# Patient Record
Sex: FEMALE | Race: WHITE | NOT HISPANIC OR LATINO
[De-identification: names, ages, dates, MRNs, and addresses within clinical notes are randomized per-mention and may not be internally consistent; named-entity substitution may affect disease eponyms.]

---

## 2017-02-12 ENCOUNTER — TRANSCRIPTION ENCOUNTER (OUTPATIENT)
Age: 77
End: 2017-02-12

## 2021-08-02 ENCOUNTER — NON-APPOINTMENT (OUTPATIENT)
Age: 81
End: 2021-08-02

## 2021-11-10 ENCOUNTER — FORM ENCOUNTER (OUTPATIENT)
Age: 81
End: 2021-11-10

## 2021-11-16 ENCOUNTER — NON-APPOINTMENT (OUTPATIENT)
Age: 81
End: 2021-11-16

## 2021-12-06 DIAGNOSIS — Z82.49 FAMILY HISTORY OF ISCHEMIC HEART DISEASE AND OTHER DISEASES OF THE CIRCULATORY SYSTEM: ICD-10-CM

## 2021-12-06 DIAGNOSIS — Z78.9 OTHER SPECIFIED HEALTH STATUS: ICD-10-CM

## 2021-12-06 DIAGNOSIS — K57.32 DIVERTICULITIS OF LARGE INTESTINE W/OUT PERFORATION OR ABSCESS W/OUT BLEEDING: ICD-10-CM

## 2021-12-06 DIAGNOSIS — Z93.3 COLOSTOMY STATUS: ICD-10-CM

## 2021-12-06 DIAGNOSIS — Z82.3 FAMILY HISTORY OF STROKE: ICD-10-CM

## 2021-12-06 DIAGNOSIS — Z63.4 DISAPPEARANCE AND DEATH OF FAMILY MEMBER: ICD-10-CM

## 2021-12-06 DIAGNOSIS — F51.04 PSYCHOPHYSIOLOGIC INSOMNIA: ICD-10-CM

## 2021-12-06 DIAGNOSIS — Z98.890 OTHER SPECIFIED POSTPROCEDURAL STATES: ICD-10-CM

## 2021-12-06 DIAGNOSIS — Z87.81 PERSONAL HISTORY OF (HEALED) TRAUMATIC FRACTURE: ICD-10-CM

## 2021-12-06 DIAGNOSIS — F17.200 NICOTINE DEPENDENCE, UNSPECIFIED, UNCOMPLICATED: ICD-10-CM

## 2021-12-06 RX ORDER — ATORVASTATIN CALCIUM 10 MG/1
10 TABLET, FILM COATED ORAL
Refills: 0 | Status: ACTIVE | COMMUNITY

## 2021-12-06 SDOH — SOCIAL STABILITY - SOCIAL INSECURITY: DISSAPEARANCE AND DEATH OF FAMILY MEMBER: Z63.4

## 2021-12-10 ENCOUNTER — APPOINTMENT (OUTPATIENT)
Dept: FAMILY MEDICINE | Facility: CLINIC | Age: 81
End: 2021-12-10
Payer: MEDICARE

## 2021-12-10 VITALS
WEIGHT: 117 LBS | HEIGHT: 66.5 IN | TEMPERATURE: 97.7 F | SYSTOLIC BLOOD PRESSURE: 124 MMHG | DIASTOLIC BLOOD PRESSURE: 84 MMHG | HEART RATE: 60 BPM | OXYGEN SATURATION: 98 % | BODY MASS INDEX: 18.58 KG/M2

## 2021-12-10 PROCEDURE — G0439: CPT

## 2021-12-10 RX ORDER — LOSARTAN POTASSIUM 50 MG/1
50 TABLET, FILM COATED ORAL
Refills: 0 | Status: DISCONTINUED | COMMUNITY
End: 2021-12-10

## 2021-12-10 RX ORDER — METOPROLOL SUCCINATE 50 MG/1
50 TABLET, EXTENDED RELEASE ORAL
Refills: 0 | Status: ACTIVE | COMMUNITY

## 2021-12-10 RX ORDER — LOSARTAN POTASSIUM 100 MG/1
100 TABLET, FILM COATED ORAL
Refills: 0 | Status: ACTIVE | COMMUNITY

## 2021-12-10 NOTE — ASSESSMENT
[FreeTextEntry1] : KIERA KESSLER is a 81 year old female here for a physical exam. She has a history of peripheral arterial disease, hypercholesterolemia, hypertension, anxiety, depression, and insomnia. \par \par She sees her cardiologist (Dr. Aragon) regularly and is not due for an EKG.  She had a pacemaker in August.\par \par She is taking Alprazolam regularly for anxiety and insomnia and requests a refill for this. She usually takes 2 tablets daily but occasionally needs an extra pill. She asks for a new Rx for 90 pills and will try to make this last 45 days. We discussed that there are other options to treat insomnia and anxiety but she declines.\par \par RF Xanax\par \par [facility]

## 2021-12-10 NOTE — HISTORY OF PRESENT ILLNESS
[FreeTextEntry1] : KIERA KESSLER is a 81 year old female here for a physical exam.  [de-identified] : Her last PE was 8/2020\par Her last tetanus shot was unknown; patient declined booster\par She has had Prevnar and Pneumovax \par She has not had Shingrix \par COVID vaccine\par Her last dentist visit was less than 6 months ago \par Her last eye doctor appointment was less than one year ago \par Her last dermatologist visit was less than one year ago \par Her diet is healthy overall\par Exercise: rarely\par Her last GYN visit was 2014, prior to hysterectomy, patient declines further testing \par Her last mammogram was 3/2017\par Her last colonoscopy was 2014\par Her last DEXA was unknown, patient declines further testing

## 2021-12-10 NOTE — PLAN
[FreeTextEntry1] : Reviewed age-appropriate preventive screening tests with patient. She declines all preventive testing. She is getting the high dose flu shot at [facility] later this month. She declines any other vaccines.\par \par She is not fasting today and would like to go to [facility] for her blood work.\par \par Discussed clean eating (e.g. Mediterranean style diet) and recommendations for regular exercise/staying as physically active as possible.\par \par Reviewed importance of good self care (e.g. meditation, yoga, adequate rest, regular exercise, magnesium, clean eating, etc.).

## 2022-01-10 ENCOUNTER — FORM ENCOUNTER (OUTPATIENT)
Age: 82
End: 2022-01-10

## 2022-02-09 ENCOUNTER — APPOINTMENT (OUTPATIENT)
Dept: FAMILY MEDICINE | Facility: CLINIC | Age: 82
End: 2022-02-09
Payer: MEDICARE

## 2022-02-09 VITALS
HEIGHT: 66.5 IN | HEART RATE: 71 BPM | BODY MASS INDEX: 17.95 KG/M2 | DIASTOLIC BLOOD PRESSURE: 80 MMHG | TEMPERATURE: 95.1 F | OXYGEN SATURATION: 97 % | WEIGHT: 113 LBS | SYSTOLIC BLOOD PRESSURE: 146 MMHG

## 2022-02-09 DIAGNOSIS — L01.00 IMPETIGO, UNSPECIFIED: ICD-10-CM

## 2022-02-09 DIAGNOSIS — B00.9 HERPESVIRAL INFECTION, UNSPECIFIED: ICD-10-CM

## 2022-02-09 DIAGNOSIS — Z86.19 PERSONAL HISTORY OF OTHER INFECTIOUS AND PARASITIC DISEASES: ICD-10-CM

## 2022-02-09 PROCEDURE — 99213 OFFICE O/P EST LOW 20 MIN: CPT

## 2022-02-09 NOTE — HEALTH RISK ASSESSMENT
[Current] : Current [Yes] : Yes [4 or more  times a week (4 pts)] : 4 or more  times a week (4 points) [1 or 2 (0 pts)] : 1 or 2 (0 points) [Never (0 pts)] : Never (0 points) [No] : In the past 12 months have you used drugs other than those required for medical reasons? No [One fall no injury in past year] : Patient reported one fall in the past year without injury [0] : 2) Feeling down, depressed, or hopeless: Not at all (0) [PHQ-2 Negative - No further assessment needed] : PHQ-2 Negative - No further assessment needed [de-identified] : 1 PPD [Audit-CScore] : 4 [de-identified] : walks [[id number]] : 0

## 2022-02-09 NOTE — REVIEW OF SYSTEMS
[Earache] : no earache [Nasal Discharge] : nasal discharge [Sore Throat] : no sore throat [Postnasal Drip] : no postnasal drip [Itching] : Itching [Skin Rash] : skin rash [Negative] : Neurological

## 2022-02-09 NOTE — PHYSICAL EXAM
[Normal Outer Ear/Nose] : the outer ears and nose were normal in appearance [Normal Oropharynx] : the oropharynx was normal [Normal] : normal rate, regular rhythm, normal S1 and S2 and no murmur heard [No Edema] : there was no peripheral edema [de-identified] : mild edema/erythema of the nasal mucosa with clear drainage, +PND [de-identified] : multiple small erythematous lesions between the nose and lips with larger lesion and honey-colored crusting just above the vermillion border, no active bleeding/drainage.  no rash over the back, no vesicular lesions on the skin of the back.

## 2022-02-09 NOTE — ASSESSMENT
[FreeTextEntry1] : Patient is a 80yo female presenting with 1 week of rash beneath the nose above the lip, has HSV apperance but also with honey colored lesions indicating possible superimposed bacterial infection from repetitive nasal drainage and wiping of the nose.  No skin lesion on the back, pt feels symptoms along the bra strap.\par \par HSV/Impetigo\par - Pt with history of HSV in the past.\par - Valacyclovir 1g q12h x2 doses.\par - Mupirocin topical TID.\par - Keep area clean and dry, try and avoid repetitive wiping.\par - Follow up with Dermatology if symptoms persist.\par \par Pruritus of back\par - Area of pruritus follows bra lines.\par - Advised to change bra, wear something with less lace to see if this helps.\par - May use topical calamine lotion for itching.\par - May try OTC Benadryl as needed for itching.\par - Follow up if symptoms persist/worsen or if rash forms.\par \par Call the office or go to the ED immediately if you develop new, worsening or concerning symptoms including high fever, severe headache/worst headache of your life, confusion, dizziness/lightheadedness, loss of consciousness, severe chest pain, difficulty breathing, shortness of breath, severe abdominal pain, excessive vomiting/diarrhea, inability to feel/move the extremities, or any other concerning symptoms.\par

## 2022-02-09 NOTE — HISTORY OF PRESENT ILLNESS
[FreeTextEntry8] : Pt with increased clear nasal drainage causing sores underneath the nose above the lips.\par Denies recent illness, fever, cough, sneezing.\par Has had HSV of the lip in the past.  States this feels slightly similar but less tingling.\par Pt tried Abreva without improvement.\par \par States has a small area on the back that she is concerned may be shingles.\par Pt states area is itching, not burning.\par Denies prior history of shingles.\par Unsure if she ever had chicken pox.\par Did not receive Shingles vaccination.

## 2022-03-15 ENCOUNTER — FORM ENCOUNTER (OUTPATIENT)
Age: 82
End: 2022-03-15

## 2022-03-30 ENCOUNTER — FORM ENCOUNTER (OUTPATIENT)
Age: 82
End: 2022-03-30

## 2022-09-28 ENCOUNTER — FORM ENCOUNTER (OUTPATIENT)
Age: 82
End: 2022-09-28

## 2023-03-23 ENCOUNTER — FORM ENCOUNTER (OUTPATIENT)
Age: 83
End: 2023-03-23

## 2023-03-24 ENCOUNTER — OFFICE (OUTPATIENT)
Dept: URBAN - METROPOLITAN AREA CLINIC 70 | Facility: CLINIC | Age: 83
Setting detail: OPHTHALMOLOGY
End: 2023-03-24
Payer: MEDICARE

## 2023-03-24 DIAGNOSIS — H25.13: ICD-10-CM

## 2023-03-24 DIAGNOSIS — D31.32: ICD-10-CM

## 2023-03-24 DIAGNOSIS — H01.001: ICD-10-CM

## 2023-03-24 DIAGNOSIS — H35.54: ICD-10-CM

## 2023-03-24 DIAGNOSIS — H01.002: ICD-10-CM

## 2023-03-24 DIAGNOSIS — H16.223: ICD-10-CM

## 2023-03-24 DIAGNOSIS — H40.013: ICD-10-CM

## 2023-03-24 DIAGNOSIS — H01.004: ICD-10-CM

## 2023-03-24 DIAGNOSIS — H01.005: ICD-10-CM

## 2023-03-24 PROBLEM — H52.7 REFRACTIVE ERROR ; BOTH EYES: Status: ACTIVE | Noted: 2023-03-24

## 2023-03-24 PROCEDURE — 92250 FUNDUS PHOTOGRAPHY W/I&R: CPT | Performed by: STUDENT IN AN ORGANIZED HEALTH CARE EDUCATION/TRAINING PROGRAM

## 2023-03-24 PROCEDURE — 92014 COMPRE OPH EXAM EST PT 1/>: CPT | Performed by: STUDENT IN AN ORGANIZED HEALTH CARE EDUCATION/TRAINING PROGRAM

## 2023-03-24 ASSESSMENT — KERATOMETRY
OS_AXISANGLE_DEGREES: 033
OD_K1POWER_DIOPTERS: 44.50
OD_AXISANGLE_DEGREES: 151
OS_K2POWER_DIOPTERS: 45.00
OD_K2POWER_DIOPTERS: 45.00
OS_K1POWER_DIOPTERS: 44.25

## 2023-03-24 ASSESSMENT — REFRACTION_MANIFEST
OD_VA1: 20/NI
OD_SPHERE: +2.75
OD_ADD: +2.75
OS_ADD: +2.75
OS_SPHERE: +3.25
OD_CYLINDER: -1.00
OS_AXIS: 96
OS_VA1: 20/NI
OD_AXIS: 73
OS_CYLINDER: -0.75

## 2023-03-24 ASSESSMENT — REFRACTION_CURRENTRX
OD_AXIS: 073
OS_AXIS: 095
OD_CYLINDER: -1.00
OS_AXIS: 103
OD_OVR_VA: 20/
OD_CYLINDER: -1.00
OS_OVR_VA: 20/
OS_OVR_VA: 20/
OD_SPHERE: +2.75
OD_SPHERE: +4.50
OS_CYLINDER: -0.75
OD_AXIS: 071
OS_CYLINDER: -0.75
OS_SPHERE: +3.25
OD_OVR_VA: 20/
OS_SPHERE: +4.75

## 2023-03-24 ASSESSMENT — REFRACTION_AUTOREFRACTION
OS_AXIS: 92
OD_SPHERE: +2.50
OS_SPHERE: +2.75
OD_CYLINDER: -1.50
OS_CYLINDER: -1.25
OD_AXIS: 083

## 2023-03-24 ASSESSMENT — VISUAL ACUITY
OS_BCVA: 20/30
OD_BCVA: 20/30

## 2023-03-24 ASSESSMENT — SPHEQUIV_DERIVED
OS_SPHEQUIV: 2.125
OS_SPHEQUIV: 2.875
OD_SPHEQUIV: 1.75
OD_SPHEQUIV: 2.25

## 2023-03-24 ASSESSMENT — SUPERFICIAL PUNCTATE KERATITIS (SPK)
OS_SPK: 1+ 2+
OD_SPK: 1+ 2+

## 2023-03-24 ASSESSMENT — AXIALLENGTH_DERIVED
OD_AL: 22.3277
OS_AL: 22.4128
OS_AL: 22.1521
OD_AL: 22.5036

## 2023-03-24 ASSESSMENT — LID EXAM ASSESSMENTS
OD_BLEPHARITIS: RLL RUL
OS_BLEPHARITIS: LLL LUL

## 2023-03-24 ASSESSMENT — CONFRONTATIONAL VISUAL FIELD TEST (CVF)
OS_FINDINGS: FULL
OD_FINDINGS: FULL

## 2023-04-17 ENCOUNTER — FORM ENCOUNTER (OUTPATIENT)
Age: 83
End: 2023-04-17

## 2023-04-18 ENCOUNTER — OFFICE (OUTPATIENT)
Dept: URBAN - METROPOLITAN AREA CLINIC 70 | Facility: CLINIC | Age: 83
Setting detail: OPHTHALMOLOGY
End: 2023-04-18
Payer: MEDICARE

## 2023-04-18 DIAGNOSIS — H00.11: ICD-10-CM

## 2023-04-18 PROCEDURE — 99213 OFFICE O/P EST LOW 20 MIN: CPT | Performed by: OPHTHALMOLOGY

## 2023-04-18 ASSESSMENT — REFRACTION_AUTOREFRACTION
OD_CYLINDER: -1.50
OS_SPHERE: +2.75
OS_AXIS: 92
OD_SPHERE: +2.50
OS_CYLINDER: -1.25
OD_AXIS: 083

## 2023-04-18 ASSESSMENT — REFRACTION_MANIFEST
OD_CYLINDER: -1.00
OS_VA1: 20/NI
OS_SPHERE: +3.25
OD_VA1: 20/NI
OD_ADD: +2.75
OS_ADD: +2.75
OS_CYLINDER: -0.75
OD_AXIS: 73
OD_SPHERE: +2.75
OS_AXIS: 96

## 2023-04-18 ASSESSMENT — AXIALLENGTH_DERIVED
OD_AL: 22.3277
OS_AL: 22.4128
OD_AL: 22.5036
OS_AL: 22.1521

## 2023-04-18 ASSESSMENT — REFRACTION_CURRENTRX
OS_AXIS: 095
OS_AXIS: 103
OD_CYLINDER: -1.00
OS_SPHERE: +4.75
OD_AXIS: 073
OD_OVR_VA: 20/
OS_SPHERE: +3.25
OS_CYLINDER: -0.75
OS_CYLINDER: -0.75
OD_SPHERE: +2.75
OS_OVR_VA: 20/
OD_AXIS: 071
OS_OVR_VA: 20/
OD_SPHERE: +4.50
OD_CYLINDER: -1.00
OD_OVR_VA: 20/

## 2023-04-18 ASSESSMENT — KERATOMETRY
OS_K1POWER_DIOPTERS: 44.25
OD_K1POWER_DIOPTERS: 44.50
OD_K2POWER_DIOPTERS: 45.00
OS_AXISANGLE_DEGREES: 033
OD_AXISANGLE_DEGREES: 151
OS_K2POWER_DIOPTERS: 45.00

## 2023-04-18 ASSESSMENT — CONFRONTATIONAL VISUAL FIELD TEST (CVF)
OS_FINDINGS: FULL
OD_FINDINGS: FULL

## 2023-04-18 ASSESSMENT — SPHEQUIV_DERIVED
OD_SPHEQUIV: 2.25
OS_SPHEQUIV: 2.125
OS_SPHEQUIV: 2.875
OD_SPHEQUIV: 1.75

## 2023-04-18 ASSESSMENT — VISUAL ACUITY
OD_BCVA: 20/50
OS_BCVA: 20/30

## 2023-04-18 ASSESSMENT — LID EXAM ASSESSMENTS
OS_BLEPHARITIS: LLL LUL
OD_BLEPHARITIS: RLL RUL

## 2023-04-18 ASSESSMENT — SUPERFICIAL PUNCTATE KERATITIS (SPK)
OS_SPK: 1+ 2+
OD_SPK: 1+ 2+

## 2023-09-05 ENCOUNTER — APPOINTMENT (OUTPATIENT)
Dept: FAMILY MEDICINE | Facility: CLINIC | Age: 83
End: 2023-09-05
Payer: MEDICARE

## 2023-09-05 VITALS
DIASTOLIC BLOOD PRESSURE: 72 MMHG | TEMPERATURE: 97 F | SYSTOLIC BLOOD PRESSURE: 128 MMHG | HEART RATE: 73 BPM | OXYGEN SATURATION: 97 % | BODY MASS INDEX: 18.11 KG/M2 | HEIGHT: 66.5 IN | WEIGHT: 114 LBS

## 2023-09-05 DIAGNOSIS — I49.5 SICK SINUS SYNDROME: ICD-10-CM

## 2023-09-05 DIAGNOSIS — F41.9 ANXIETY DISORDER, UNSPECIFIED: ICD-10-CM

## 2023-09-05 DIAGNOSIS — F17.200 NICOTINE DEPENDENCE, UNSPECIFIED, UNCOMPLICATED: ICD-10-CM

## 2023-09-05 DIAGNOSIS — Z00.00 ENCOUNTER FOR GENERAL ADULT MEDICAL EXAMINATION W/OUT ABNORMAL FINDINGS: ICD-10-CM

## 2023-09-05 DIAGNOSIS — I73.9 PERIPHERAL VASCULAR DISEASE, UNSPECIFIED: ICD-10-CM

## 2023-09-05 DIAGNOSIS — E78.5 HYPERLIPIDEMIA, UNSPECIFIED: ICD-10-CM

## 2023-09-05 DIAGNOSIS — I10 ESSENTIAL (PRIMARY) HYPERTENSION: ICD-10-CM

## 2023-09-05 DIAGNOSIS — I25.10 ATHEROSCLEROTIC HEART DISEASE OF NATIVE CORONARY ARTERY W/OUT ANGINA PECTORIS: ICD-10-CM

## 2023-09-05 PROCEDURE — G0439: CPT

## 2023-09-05 PROCEDURE — 99406 BEHAV CHNG SMOKING 3-10 MIN: CPT

## 2023-09-05 RX ORDER — MUPIROCIN 20 MG/G
2 OINTMENT TOPICAL
Qty: 1 | Refills: 1 | Status: DISCONTINUED | COMMUNITY
Start: 2022-02-09 | End: 2023-09-05

## 2023-09-05 RX ORDER — POLYETHYLENE GLYCOL 3350 17 G
17 POWDER IN PACKET (EA) ORAL
Refills: 0 | Status: DISCONTINUED | COMMUNITY
End: 2023-09-05

## 2023-09-05 RX ORDER — VALACYCLOVIR 1 G/1
1 TABLET, FILM COATED ORAL
Qty: 2 | Refills: 0 | Status: DISCONTINUED | COMMUNITY
Start: 2022-02-09 | End: 2023-09-05

## 2023-09-05 RX ORDER — MOMETASONE FUROATE 1 MG/G
0.1 CREAM TOPICAL
Refills: 0 | Status: DISCONTINUED | COMMUNITY
End: 2023-09-05

## 2023-09-05 RX ORDER — DIPHENOXYLATE HYDROCHLORIDE AND ATROPINE SULFATE 2.5; .025 MG/1; MG/1
2.5-0.025 TABLET ORAL
Refills: 0 | Status: DISCONTINUED | COMMUNITY
End: 2023-09-05

## 2023-09-05 RX ORDER — AMLODIPINE BESYLATE 5 MG/1
5 TABLET ORAL
Qty: 90 | Refills: 0 | Status: ACTIVE | COMMUNITY
Start: 2022-12-02

## 2023-09-05 NOTE — HEALTH RISK ASSESSMENT
[Yes] : Yes [4 or more  times a week (4 pts)] : 4 or more  times a week (4 points) [1 or 2 (0 pts)] : 1 or 2 (0 points) [Never (0 pts)] : Never (0 points) [No] : In the past 12 months have you used drugs other than those required for medical reasons? No [No falls in past year] : Patient reported no falls in the past year [0] : 1) Little interest or pleasure doing things: Not at all (0) [1] : 2) Feeling down, depressed, or hopeless for several days (1) [PHQ-2 Negative - No further assessment needed] : PHQ-2 Negative - No further assessment needed [Patient declined Low Dose CT Scan] : Patient declined Low Dose CT Scan [Patient declined mammogram] : Patient declined mammogram [Patient declined PAP Smear] : Patient declined PAP Smear [Patient declined bone density test] : Patient declined bone density test [Patient declined colonoscopy] : Patient declined colonoscopy [HIV test declined] : HIV test declined [Hepatitis C test declined] : Hepatitis C test declined [None] : None [Alone] : lives alone [Retired] : retired [] :  [# Of Children ___] : has [unfilled] children [Feels Safe at Home] : Feels safe at home [Fully functional (bathing, dressing, toileting, transferring, walking, feeding)] : Fully functional (bathing, dressing, toileting, transferring, walking, feeding) [Fully functional (using the telephone, shopping, preparing meals, housekeeping, doing laundry, using] : Fully functional and needs no help or supervision to perform IADLs (using the telephone, shopping, preparing meals, housekeeping, doing laundry, using transportation, managing medications and managing finances) [Smoke Detector] : smoke detector [Carbon Monoxide Detector] : carbon monoxide detector [Safety elements used in home] : safety elements used in home [Seat Belt] :  uses seat belt [Sunscreen] : uses sunscreen [With Patient/Caregiver] : , with patient/caregiver [Reviewed no changes] : Reviewed, no changes [I will adhere to the patient's wishes.] : I will adhere to the patient's wishes. [Current] : Current [20 or more] : 20 or more [Audit-CScore] : 4 [de-identified] : walks on the treadmill 3x/wk [de-identified] : fairly well balanced [de-identified] : pt has mild depression, stressor is younger sister who has Crohn's disease, does not see a therapist and does not have interest in seeing one, does not have interest in taking antidepressant medication [[id number]] : 1 [EyeExamDate] : 04/2023 [Change in mental status noted] : No change in mental status noted [Language] : denies difficulty with language [Sexually Active] : not sexually active [High Risk Behavior] : no high risk behavior [Reports changes in hearing] : Reports no changes in hearing [Reports changes in vision] : Reports no changes in vision [Reports changes in dental health] : Reports no changes in dental health [Travel to Developing Areas] : does not  travel to developing areas [BoneDensityComments] : due, recommended today, will give RX. [de-identified] : 1 PPD

## 2023-09-05 NOTE — ASSESSMENT
[FreeTextEntry1] : Patient is an 81yo female with PMH HTN, HLD, CAD s/p PCI, SSS s/p PPM, PVD, insomnia, anxiety who presents to the office for CPE.  Health Maintenance - RX for DEXA provided. - Declines further HCM. - Plans to receive Shingrix and flu shot from pharmacy. - Pt not fasting today, will return for b/w.  Pt has phobia of needles but promises to return for fasting labs. - Eat plenty of fruits and vegetables, especially deeply colored fruits/vegetables (such as leafy greens, peaches) that are more nutrient-dense.  Continue to work hard on diet and exercise, limiting/avoiding saturated fat, fatty foods, greasy foods, red meats, white flour-based carbohydrates (cookies, cakes, white bread, white rice), and added sugars.  Chose whole grain foods and products made with whole grains over refined grains and white flour-based carbohydrates.  Avoid beverages and food with added sugar.  Limit salt intake to improve blood pressure.  Limit alcohol intake. - Try and incorporate 30 minutes of aerobic exercise to your daily routine.  HLD - Pt decided to stop Atorvastatin about 1 year ago. - Pt encouraged to re-start Atorvastatin, given hx CAD s/p PCI, PVD, smoker, etc. - Limit/avoid greasy foods, fried foods, fatty foods, and saturated fat in your diet and try and eat more lean proteins.  Anxiety - Takes Xanax 0.5mg once approximately every night.  Pt educated on risks of taking Xanax, especially nightly at her age, but pt states it calms her and she does not worry and it allows her to sleep and would like to continue taking despite risks.  Pt to continue routine f/u with Cardiology, Dr. Aragon, and EP, Dr. Henning.  Defer EKG today.  Call the office or go to the ED immediately if you develop new, worsening or concerning symptoms including high fever, severe headache/worst headache of your life, confusion, dizziness/lightheadedness, loss of consciousness, severe chest pain, difficulty breathing, shortness of breath, severe abdominal pain, excessive vomiting/diarrhea, inability to feel/move the extremities, or any other concerning symptoms.

## 2023-09-05 NOTE — HISTORY OF PRESENT ILLNESS
[FreeTextEntry1] : CPE. [de-identified] : Patient is an 81yo female with PMH HTN, HLD, CAD s/p PCI, SSS s/p PPM, PVD, insomnia, anxiety who presents to the office for CPE.   Last CPE:  12/10/2021, Dr. BETTIE Almodovar.  GYN Exam:  pt declines further evaluation.  Mammogram:  declines further testing.  DEXA:  due, recommended today, RX provided. Colonoscopy:  declines further testing.  S/p colon resection with colostomy and reversal for diverticulitis.  States she has not gone ever since and declines further testing.  GI was Dr. Collier. Ophthalmology:  04/2023.  Dermatology:  [facility], has appointment in 2 weeks with Dr. Almaguer ([place]). Dentist:  [facility]. Shingles:  due, recommended today, pt agreeable and will get from pharmacy. Flu:  pt plans to get around October at the pharmacy. Tdap:  due, recommended pt receive from the pharmacy. PNA:  PCV13 05/2016, PPSV 03/2018.  Declines Prevnar-20. COVID:  x2. LMP:  s/p hysterectomy in 2014.   Cardiology:  Dr. Aragon.  Follows every 6 months. EP:  Dr. Henning.  Follows regularly.  Pt states she decided to stop taking Atorvastatin 10mg about 1 year ago because she read an article that says that your body needs cholesterol.

## 2023-10-30 ENCOUNTER — TRANSCRIPTION ENCOUNTER (OUTPATIENT)
Age: 83
End: 2023-10-30

## 2024-01-10 RX ORDER — TRAZODONE HYDROCHLORIDE 50 MG/1
50 TABLET ORAL
Qty: 90 | Refills: 2 | Status: ACTIVE | COMMUNITY
Start: 2024-01-10 | End: 1900-01-01

## 2025-03-13 ENCOUNTER — APPOINTMENT (OUTPATIENT)
Dept: FAMILY MEDICINE | Facility: CLINIC | Age: 85
End: 2025-03-13
Payer: MEDICARE

## 2025-03-13 VITALS
SYSTOLIC BLOOD PRESSURE: 120 MMHG | DIASTOLIC BLOOD PRESSURE: 76 MMHG | HEIGHT: 66.5 IN | WEIGHT: 122 LBS | TEMPERATURE: 97.6 F | BODY MASS INDEX: 19.38 KG/M2 | HEART RATE: 76 BPM | OXYGEN SATURATION: 97 %

## 2025-03-13 DIAGNOSIS — I10 ESSENTIAL (PRIMARY) HYPERTENSION: ICD-10-CM

## 2025-03-13 DIAGNOSIS — F17.210 NICOTINE DEPENDENCE, CIGARETTES, UNCOMPLICATED: ICD-10-CM

## 2025-03-13 DIAGNOSIS — F17.200 NICOTINE DEPENDENCE, UNSPECIFIED, UNCOMPLICATED: ICD-10-CM

## 2025-03-13 DIAGNOSIS — I25.10 ATHEROSCLEROTIC HEART DISEASE OF NATIVE CORONARY ARTERY W/OUT ANGINA PECTORIS: ICD-10-CM

## 2025-03-13 DIAGNOSIS — Z00.00 ENCOUNTER FOR GENERAL ADULT MEDICAL EXAMINATION W/OUT ABNORMAL FINDINGS: ICD-10-CM

## 2025-03-13 DIAGNOSIS — I49.5 SICK SINUS SYNDROME: ICD-10-CM

## 2025-03-13 DIAGNOSIS — F41.9 ANXIETY DISORDER, UNSPECIFIED: ICD-10-CM

## 2025-03-13 DIAGNOSIS — E78.5 HYPERLIPIDEMIA, UNSPECIFIED: ICD-10-CM

## 2025-03-13 PROCEDURE — G0439: CPT

## 2025-03-13 PROCEDURE — 99406 BEHAV CHNG SMOKING 3-10 MIN: CPT

## 2025-03-13 PROCEDURE — 99213 OFFICE O/P EST LOW 20 MIN: CPT | Mod: 25
